# Patient Record
Sex: MALE | Race: WHITE | Employment: OTHER | URBAN - METROPOLITAN AREA
[De-identification: names, ages, dates, MRNs, and addresses within clinical notes are randomized per-mention and may not be internally consistent; named-entity substitution may affect disease eponyms.]

---

## 2020-01-01 ENCOUNTER — VIRTUAL VISIT (OUTPATIENT)
Dept: INTERNAL MEDICINE CLINIC | Age: 65
End: 2020-01-01
Payer: OTHER GOVERNMENT

## 2020-01-01 DIAGNOSIS — R05.8 COUGH DUE TO ACE INHIBITOR: Primary | ICD-10-CM

## 2020-01-01 DIAGNOSIS — T46.4X5A COUGH DUE TO ACE INHIBITOR: Primary | ICD-10-CM

## 2020-01-01 PROCEDURE — 99213 OFFICE O/P EST LOW 20 MIN: CPT | Performed by: INTERNAL MEDICINE

## 2020-01-01 RX ORDER — LOSARTAN POTASSIUM 50 MG/1
50 TABLET ORAL DAILY
Qty: 90 TAB | Refills: 3 | Status: SHIPPED | OUTPATIENT
Start: 2020-01-01 | End: 2021-01-01

## 2020-01-01 RX ORDER — INSULIN LISPRO 100 [IU]/ML
INJECTION, SOLUTION INTRAVENOUS; SUBCUTANEOUS
Qty: 60 ML | Refills: 3 | Status: SHIPPED | OUTPATIENT
Start: 2020-01-01

## 2020-08-09 RX ORDER — ATORVASTATIN CALCIUM 40 MG/1
TABLET, FILM COATED ORAL
Qty: 90 TAB | Refills: 3 | Status: SHIPPED | OUTPATIENT
Start: 2020-08-09

## 2020-11-20 PROBLEM — T46.4X5A COUGH DUE TO ACE INHIBITOR: Status: ACTIVE | Noted: 2020-01-01

## 2020-11-20 PROBLEM — R05.8 COUGH DUE TO ACE INHIBITOR: Status: ACTIVE | Noted: 2020-01-01

## 2020-11-20 NOTE — PROGRESS NOTES
Abhilash Major is a 63 y.o. male presenting for cough. Ranjit Hunter is seen today virtually using HIPAA compliant video conferencing technology. The patient has previously provided full consent to use this technology and understands the risks and benefits of proceeding. I am seeing the patient today from my office in [place] and the patient is in his home in [place]. No chief complaint on file. HPI Ranjit Hunter was down for routine 3-month follow-up of his hypertension and diabetes however he has a problem that he wanted to discuss. He has had a chronic cough producing some clear sputum for almost over a year he thinks. It pretty much constant he has not had any associated fever purulent sputum production pleuritic pain shortness of breath or hemoptysis. It does not seem to be exacerbated or initiated by anything that he can pinpoint. He has tried everything environmentally thinking it might be allergies. Frankly when I look over his list of medications I believe it probably is related to his ACE inhibitor. As far as his blood sugars go everything has looked good. He has had no hyper or hypoglycemic episodes. He continues on his previous medicines. No past medical history on file. Current Outpatient Medications on File Prior to Visit Medication Sig Dispense Refill  atorvastatin (LIPITOR) 40 mg tablet TAKE 1 TABLET DAILY 90 Tab 3 No current facility-administered medications on file prior to visit. ROS as noted in the history of present illness There were no vitals taken for this visit. Physical Exam because of the virtual nature of this exam no physical was able to be performed. Assessment & Plan as noted I believe that the chronic cough is related to his ACE inhibitor. I am going to switch him to an ARB because he is diabetic and probably needs the renal protective dosing.   I will recheck him in 2 to 3 months. We discussed at length ACE inhibitor related cough.

## 2020-12-28 PROBLEM — I10 ESSENTIAL HYPERTENSION: Status: ACTIVE | Noted: 2020-01-01

## 2020-12-28 PROBLEM — M19.90 OSTEOARTHRITIS: Status: ACTIVE | Noted: 2020-01-01

## 2020-12-28 PROBLEM — E11.9 DIABETES MELLITUS (HCC): Status: ACTIVE | Noted: 2020-01-01

## 2021-01-01 ENCOUNTER — TELEPHONE (OUTPATIENT)
Dept: INTERNAL MEDICINE CLINIC | Age: 66
End: 2021-01-01

## 2021-01-01 ENCOUNTER — HOSPITAL ENCOUNTER (EMERGENCY)
Age: 66
Discharge: HOME | End: 2021-05-19
Attending: EMERGENCY MEDICINE
Payer: OTHER GOVERNMENT

## 2021-01-01 ENCOUNTER — OFFICE VISIT (OUTPATIENT)
Dept: INTERNAL MEDICINE CLINIC | Age: 66
End: 2021-01-01
Payer: OTHER GOVERNMENT

## 2021-01-01 ENCOUNTER — OFFICE VISIT (OUTPATIENT)
Dept: FAMILY MEDICINE CLINIC | Age: 66
End: 2021-01-01
Payer: OTHER GOVERNMENT

## 2021-01-01 ENCOUNTER — APPOINTMENT (OUTPATIENT)
Dept: CT IMAGING | Age: 66
End: 2021-01-01
Attending: EMERGENCY MEDICINE
Payer: OTHER GOVERNMENT

## 2021-01-01 ENCOUNTER — HOSPITAL ENCOUNTER (OUTPATIENT)
Dept: GENERAL RADIOLOGY | Age: 66
Discharge: HOME | End: 2021-02-26
Attending: INTERNAL MEDICINE
Payer: OTHER GOVERNMENT

## 2021-01-01 ENCOUNTER — CLINICAL SUPPORT (OUTPATIENT)
Dept: INTERNAL MEDICINE CLINIC | Age: 66
End: 2021-01-01

## 2021-01-01 ENCOUNTER — HOSPITAL ENCOUNTER (OUTPATIENT)
Dept: ULTRASOUND IMAGING | Age: 66
Discharge: HOME | End: 2021-05-10
Attending: NURSE PRACTITIONER
Payer: OTHER GOVERNMENT

## 2021-01-01 VITALS
RESPIRATION RATE: 35 BRPM | DIASTOLIC BLOOD PRESSURE: 78 MMHG | BODY MASS INDEX: 42.86 KG/M2 | TEMPERATURE: 98.7 F | OXYGEN SATURATION: 95 % | WEIGHT: 315 LBS | SYSTOLIC BLOOD PRESSURE: 138 MMHG | HEART RATE: 135 BPM

## 2021-01-01 VITALS
HEART RATE: 91 BPM | SYSTOLIC BLOOD PRESSURE: 109 MMHG | TEMPERATURE: 98.3 F | OXYGEN SATURATION: 95 % | DIASTOLIC BLOOD PRESSURE: 65 MMHG | HEIGHT: 72 IN | BODY MASS INDEX: 38.06 KG/M2 | WEIGHT: 281 LBS

## 2021-01-01 VITALS
RESPIRATION RATE: 20 BRPM | BODY MASS INDEX: 37.32 KG/M2 | WEIGHT: 275.2 LBS | SYSTOLIC BLOOD PRESSURE: 120 MMHG | DIASTOLIC BLOOD PRESSURE: 68 MMHG | TEMPERATURE: 97.9 F

## 2021-01-01 VITALS
RESPIRATION RATE: 16 BRPM | HEIGHT: 72 IN | TEMPERATURE: 98.2 F | DIASTOLIC BLOOD PRESSURE: 63 MMHG | SYSTOLIC BLOOD PRESSURE: 151 MMHG | OXYGEN SATURATION: 99 % | BODY MASS INDEX: 36.57 KG/M2 | WEIGHT: 270 LBS | HEART RATE: 75 BPM

## 2021-01-01 VITALS
WEIGHT: 278.4 LBS | RESPIRATION RATE: 20 BRPM | SYSTOLIC BLOOD PRESSURE: 110 MMHG | BODY MASS INDEX: 37.71 KG/M2 | DIASTOLIC BLOOD PRESSURE: 70 MMHG | HEIGHT: 72 IN

## 2021-01-01 VITALS
BODY MASS INDEX: 38.82 KG/M2 | DIASTOLIC BLOOD PRESSURE: 88 MMHG | RESPIRATION RATE: 20 BRPM | TEMPERATURE: 98.7 F | WEIGHT: 286.6 LBS | HEIGHT: 72 IN | SYSTOLIC BLOOD PRESSURE: 150 MMHG

## 2021-01-01 DIAGNOSIS — C83.32 DIFFUSE LARGE B-CELL LYMPHOMA OF INTRATHORACIC LYMPH NODES (HCC): ICD-10-CM

## 2021-01-01 DIAGNOSIS — R22.1 NECK MASS: ICD-10-CM

## 2021-01-01 DIAGNOSIS — R05.9 COUGH: Primary | ICD-10-CM

## 2021-01-01 DIAGNOSIS — I10 ESSENTIAL HYPERTENSION: Primary | ICD-10-CM

## 2021-01-01 DIAGNOSIS — R59.0 CERVICAL LYMPHADENOPATHY: Primary | ICD-10-CM

## 2021-01-01 DIAGNOSIS — R22.1 MASS OF NECK: ICD-10-CM

## 2021-01-01 DIAGNOSIS — R05.9 COUGH: ICD-10-CM

## 2021-01-01 DIAGNOSIS — R22.1 MASS OF NECK: Primary | ICD-10-CM

## 2021-01-01 DIAGNOSIS — R01.1 MURMUR: ICD-10-CM

## 2021-01-01 DIAGNOSIS — R22.1 NECK MASS: Primary | ICD-10-CM

## 2021-01-01 LAB
ANION GAP SERPL CALC-SCNC: 7 MMOL/L
BASOPHILS # BLD: 0 K/UL (ref 0–0.1)
BASOPHILS NFR BLD: 1 % (ref 0–2)
BUN SERPL-MCNC: 32 MG/DL (ref 9–21)
BUN/CREAT SERPL: 25
CA-I BLD-MCNC: 10 MG/DL (ref 8.5–10.5)
CHLORIDE SERPL-SCNC: 107 MMOL/L (ref 94–111)
CO2 SERPL-SCNC: 24 MMOL/L (ref 21–33)
CREAT SERPL-MCNC: 1.3 MG/DL (ref 0.8–1.5)
EOSINOPHIL # BLD: 0.3 K/UL (ref 0–0.4)
EOSINOPHIL NFR BLD: 5 % (ref 0–5)
ERYTHROCYTE [DISTWIDTH] IN BLOOD BY AUTOMATED COUNT: 15.2 % (ref 11.6–14.5)
GLUCOSE SERPL-MCNC: 181 MG/DL (ref 70–110)
HCT VFR BLD AUTO: 37.2 % (ref 36–48)
HGB BLD-MCNC: 11.8 G/DL (ref 13–16)
IMM GRANULOCYTES # BLD AUTO: 0 K/UL
IMM GRANULOCYTES NFR BLD AUTO: 0 %
LYMPHOCYTES # BLD: 1.2 K/UL (ref 0.9–3.6)
LYMPHOCYTES NFR BLD: 21 % (ref 21–52)
MCH RBC QN AUTO: 29.4 PG (ref 24–34)
MCHC RBC AUTO-ENTMCNC: 31.7 G/DL (ref 31–37)
MCV RBC AUTO: 92.8 FL (ref 74–97)
MONOCYTES # BLD: 0.6 K/UL (ref 0.05–1.2)
MONOCYTES NFR BLD: 11 % (ref 3–10)
NEUTS SEG # BLD: 3.5 K/UL (ref 1.8–8)
NEUTS SEG NFR BLD: 62 % (ref 40–73)
PLATELET # BLD AUTO: 147 K/UL (ref 135–420)
PMV BLD AUTO: 11.2 FL (ref 9.2–11.8)
POTASSIUM SERPL-SCNC: 4.5 MMOL/L (ref 3.2–5.1)
RBC # BLD AUTO: 4.01 M/UL (ref 4.7–5.5)
SODIUM SERPL-SCNC: 138 MMOL/L (ref 135–145)
WBC # BLD AUTO: 5.6 K/UL (ref 4.6–13.2)

## 2021-01-01 PROCEDURE — 99283 EMERGENCY DEPT VISIT LOW MDM: CPT

## 2021-01-01 PROCEDURE — 99213 OFFICE O/P EST LOW 20 MIN: CPT | Performed by: NURSE PRACTITIONER

## 2021-01-01 PROCEDURE — 76536 US EXAM OF HEAD AND NECK: CPT

## 2021-01-01 PROCEDURE — 71046 X-RAY EXAM CHEST 2 VIEWS: CPT

## 2021-01-01 PROCEDURE — 99213 OFFICE O/P EST LOW 20 MIN: CPT | Performed by: INTERNAL MEDICINE

## 2021-01-01 PROCEDURE — 70491 CT SOFT TISSUE NECK W/DYE: CPT

## 2021-01-01 PROCEDURE — 85025 COMPLETE CBC W/AUTO DIFF WBC: CPT

## 2021-01-01 PROCEDURE — 74011000636 HC RX REV CODE- 636: Performed by: EMERGENCY MEDICINE

## 2021-01-01 PROCEDURE — 80048 BASIC METABOLIC PNL TOTAL CA: CPT

## 2021-01-01 PROCEDURE — 99214 OFFICE O/P EST MOD 30 MIN: CPT | Performed by: INTERNAL MEDICINE

## 2021-01-01 RX ORDER — INSULIN GLARGINE 100 [IU]/ML
INJECTION, SOLUTION SUBCUTANEOUS
Qty: 225 ML | Refills: 3 | Status: SHIPPED | OUTPATIENT
Start: 2021-01-01

## 2021-01-01 RX ORDER — GLIPIZIDE 10 MG/1
10 TABLET ORAL DAILY
COMMUNITY
End: 2021-01-01

## 2021-01-01 RX ORDER — BLOOD-GLUCOSE METER
KIT MISCELLANEOUS
Qty: 270 STRIP | Refills: 3 | Status: SHIPPED | OUTPATIENT
Start: 2021-01-01

## 2021-01-01 RX ORDER — PIOGLITAZONEHYDROCHLORIDE 15 MG/1
1 TABLET ORAL DAILY
COMMUNITY
Start: 2021-01-01 | End: 2021-01-01

## 2021-01-01 RX ORDER — MINERAL OIL
ENEMA (ML) RECTAL
Qty: 90 TAB | Refills: 3 | Status: SHIPPED | OUTPATIENT
Start: 2021-01-01

## 2021-01-01 RX ORDER — METFORMIN HYDROCHLORIDE 500 MG/1
1 TABLET ORAL 2 TIMES DAILY WITH MEALS
COMMUNITY
End: 2021-01-01

## 2021-01-01 RX ORDER — PIOGLITAZONEHYDROCHLORIDE 15 MG/1
TABLET ORAL
Qty: 90 TABLET | Refills: 3 | Status: SHIPPED | OUTPATIENT
Start: 2021-01-01

## 2021-01-01 RX ORDER — MINERAL OIL
1 ENEMA (ML) RECTAL DAILY
COMMUNITY
End: 2021-01-01

## 2021-01-01 RX ORDER — METFORMIN HYDROCHLORIDE 500 MG/1
TABLET ORAL
Qty: 360 TAB | Refills: 3 | Status: SHIPPED | OUTPATIENT
Start: 2021-01-01

## 2021-01-01 RX ORDER — LOSARTAN POTASSIUM 100 MG/1
100 TABLET ORAL DAILY
Qty: 90 TAB | Refills: 3 | Status: SHIPPED | OUTPATIENT
Start: 2021-01-01

## 2021-01-01 RX ORDER — GLIPIZIDE 10 MG/1
TABLET ORAL
Qty: 90 TAB | Refills: 3 | Status: SHIPPED | OUTPATIENT
Start: 2021-01-01

## 2021-01-01 RX ORDER — FLUTICASONE PROPIONATE 50 MCG
2 SPRAY, SUSPENSION (ML) NASAL DAILY
COMMUNITY

## 2021-01-01 RX ORDER — INSULIN GLARGINE 100 [IU]/ML
80 INJECTION, SOLUTION SUBCUTANEOUS DAILY
COMMUNITY
End: 2021-01-01

## 2021-01-01 RX ADMIN — IOPAMIDOL 100 ML: 755 INJECTION, SOLUTION INTRAVENOUS at 17:50

## 2021-02-23 PROBLEM — S19.9XXA NECK INJURY: Status: ACTIVE | Noted: 2017-01-30

## 2021-02-23 PROBLEM — T14.90XA TRAUMA: Status: ACTIVE | Noted: 2017-01-30

## 2021-02-23 PROBLEM — M43.10 RETROLISTHESIS: Status: ACTIVE | Noted: 2017-01-30

## 2021-02-25 NOTE — PROGRESS NOTES
Lex Kenny is a 63 y.o. male presenting for Beatties and hypertension. Chief Complaint Patient presents with  Cough HPI Roland Burkitt comes in today and he continues to have paroxysms of coughing. It seems that switching him to an ARB did not seem to do a whole lot of good. He blames it all on nasal drainage and allergies. He tells me he is retiring in a week and then plans to get in his jeep and drive across country to see and visit with his daughter in [place]. Hopefully he will be a little easier to manage once he gets off of the crazy work schedule that he presently keeps. He is at work in the mid afternoon and then does not get off and anywhere between 1 and 3:00 in the morning sometimes later. He has been checking his blood sugars on a regular basis and they have all been 140 or below which is pretty reasonable. I told him when he got back from [place] perhaps we could get a little more aggressive about his medical treatments although he does not sound like he is particularly interested in that. Past Medical History:  
Diagnosis Date  Diabetes mellitus ([facility] [address] 12/28/2020  Essential hypertension 12/28/2020  Osteoarthritis 12/28/2020 Current Outpatient Medications on File Prior to Visit Medication Sig Dispense Refill  glipiZIDE (GLUCOTROL) 10 mg tablet Take 10 mg by mouth daily.  fluticasone propionate (FLONASE) 50 mcg/actuation nasal spray 2 Sprays by Both Nostrils route daily.  metFORMIN (GLUCOPHAGE) 500 mg tablet Take 1 Tab by mouth two (2) times daily (with meals).  fexofenadine (ALLEGRA) 180 mg tablet Take 1 Tab by mouth daily.  insulin glargine (LANTUS,BASAGLAR) 100 unit/mL (3 mL) inpn 80 Units by SubCUTAneous route daily.  insulin lispro (HUMALOG) 100 unit/mL injection INJECT 10 TO 20 UNITS UNDER THE SKIN WITH MEALS USING A SLIDING SCALE 60 mL 3  
 losartan (COZAAR) 50 mg tablet Take 1 Tab by mouth daily.  90 Tab 3  
  atorvastatin (LIPITOR) 40 mg tablet TAKE 1 TABLET DAILY 90 Tab 3  pioglitazone (ACTOS) 15 mg tablet Take 1 Tab by mouth daily. No current facility-administered medications on file prior to visit. ROS was reviewed and negative except as noted above Visit Vitals Temp 98.7 °F (37.1 °C) Resp 20 Ht 6' (1.829 m) Wt 286 lb 9.6 oz (130 kg) BMI 38.87 kg/m² Physical Exam overweight  man alert and oriented no acute distress normocephalic conjunctiva pink sclera anicteric neck supple no lymphadenopathy lungs today sound clear to auscultation heart rhythm regular extremities without edema Assessment & Plan him increasing his losartan to 100 mg a day and will just take 2 of the 50s until he gets his 100 mg prescription from [facility]. I will have him come by in about 10 days just for a nurse check to check his blood pressure. I am also going to order a chest x-ray because of his cough.

## 2021-02-25 NOTE — PROGRESS NOTES
Alma Aiken presents today for Chief Complaint Patient presents with  Cough Is someone accompanying this pt? no 
 
Is the patient using any DME equipment during [address]? no 
 
Depression Screening: 
3 most recent PHQ Screens 2/25/2021 Little interest or pleasure in doing things Not at all Feeling down, depressed, irritable, or hopeless Not at all Total Score PHQ 2 0 Learning Assessment: 
Learning Assessment 2/25/2021 PRIMARY LEARNER Patient HIGHEST LEVEL OF EDUCATION - PRIMARY LEARNER  GRADUATED HIGH SCHOOL OR GED  
BARRIERS PRIMARY LEARNER NONE  
CO-LEARNER CAREGIVER No  
PRIMARY LANGUAGE ENGLISH  
LEARNER PREFERENCE PRIMARY OTHER (COMMENT) ANSWERED BY patient RELATIONSHIP SELF Abuse Screening: 
Abuse Screening Questionnaire 2/25/2021 Do you ever feel afraid of your partner? Victor M Horde Are you in a relationship with someone who physically or mentally threatens you? Victor M Horde Is it safe for you to go home? Uli Whitaker Fall Risk Fall Risk Assessment, last 12 mths 2/25/2021 Able to walk? Yes Fall in past 12 months? 0 Do you feel unsteady? 0 Are you worried about falling 0 ADL ADL Assessment 2/25/2021 Feeding yourself No Help Needed Getting from bed to chair No Help Needed Getting dressed No Help Needed Bathing or showering No Help Needed Walk across the room (includes cane/walker) No Help Needed Using the telphone No Help Needed Taking your medications No Help Needed Preparing meals No Help Needed Managing money (expenses/bills) No Help Needed Moderately strenuous housework (laundry) No Help Needed Shopping for personal items (toiletries/medicines) No Help Needed Shopping for groceries No Help Needed Driving No Help Needed Climbing a flight of stairs No Help Needed Getting to places beyond walking distances No Help Needed Health Maintenance reviewed and discussed and ordered per Provider. Health Maintenance Due Topic Date Due  
  Hepatitis C Screening  1955  Pneumococcal 0-64 years (1 of 1 - PPSV23) 03/16/1963  Foot Exam Q1  03/16/1967  MICROALBUMIN Q1  03/16/1967  Eye Exam Retinal or Dilated  03/16/1967  Lipid Screen  03/16/1967  COVID-19 Vaccine (1 of 2) 03/16/1973  
 DTaP/Tdap/Td series (1 - Tdap) 03/16/1978  Shingrix Vaccine Age 50> (1 of 2) 03/16/2007  Colorectal Cancer Screening Combo  03/16/2007  Flu Vaccine (1) 09/01/2020 Manas Lauren Coordination of Care: 1. Have you been to the ER, urgent care clinic since your last visit? Hospitalized since your last visit? Yes, [facility] 2. Have you seen or consulted any other health care providers outside of the [address] [place] [address] since your last visit? Include any pap smears or colon screening.

## 2021-03-04 NOTE — PROGRESS NOTES
Vianney Machuca (: 1955) is a 63 y.o. male, established patient, here for evaluation of the following chief complaint(s): 
No chief complaint on file. Patient came by to have BP retaken after getting an elevated reading on his visit earlier this week. BP today is 120/68. Dr Barraza notified and pleased with result. Patient reassured. An electronic signature was used to authenticate this note.  
-- Vashti Moreau LPN

## 2021-05-05 PROBLEM — R22.1 MASS OF NECK: Status: ACTIVE | Noted: 2021-01-01

## 2021-05-05 PROBLEM — R01.1 MURMUR: Status: ACTIVE | Noted: 2021-01-01

## 2021-05-05 NOTE — PROGRESS NOTES
HPI 
Ashanti Lopez is a 61 y.o. male and presents today for Cyst (1 month, on neck) PCP[facility] Patient has no URI symptoms today. He reports that he tried to contact his PCPs office due to a mass in the right region of his neck. He was transferred to our office for sick visit. I am not sure how this happened. He states he has been out of town for the past few months. States that about 2 months ago he noticed a mass in his right neck. He says that the area is a little tender. He does not have a sore throat. He denies fever, cough, chills, body aches, N/D/V, or any other URI symptoms. He has no concern for Covid. Not seen anyone for the mass in his neck. He denies any family or personal history of thyroid disorders. Recent Travel Screening and Travel History documentation Travel Screening Question   Response In the last month, have you been in contact with someone who was confirmed or suspected to have Coronavirus / COVID-19? No / Tomás Moreno Have you had a COVID-19 viral test in the last 14 days? No  
 Do you have any of the following new or worsening symptoms? Have you traveled internationally or domestically in the last month? No  
  
Travel History   Travel since 04/05/21 No documented travel since 04/05/21 Screening On the basis of positive PHQ-9 screening ( ), C-SSRS completed. Patient will follow-up as needed/as directed with PCP. Allergies Allergies Allergen Reactions  Pollen Extracts Shortness of Breath Pt report having Hay Fever Medications Current Outpatient Medications Medication Sig Dispense  glipiZIDE (GLUCOTROL) 10 mg tablet TAKE 1 TABLET DAILY 90 Tab  metFORMIN (GLUCOPHAGE) 500 mg tablet TAKE 2 TABLETS TWICE A  Tab  fexofenadine (ALLEGRA) 180 mg tablet TAKE 1 TABLET DAILY ( FEXOFENADINE IS GENERIC FOR ALLEGRA ) 90 Tab  pioglitazone (ACTOS) 15 mg tablet Take 1 Tab by mouth daily.    
 fluticasone propionate (FLONASE) 50 mcg/actuation nasal spray 2 Sprays by Both Nostrils route daily.  insulin glargine (LANTUS,BASAGLAR) 100 unit/mL (3 mL) inpn 80 Units by SubCUTAneous route daily.  losartan (COZAAR) 100 mg tablet Take 1 Tab by mouth daily. 90 Tab  insulin lispro (HUMALOG) 100 unit/mL injection INJECT 10 TO 20 UNITS UNDER THE SKIN WITH MEALS USING A SLIDING SCALE 60 mL  atorvastatin (LIPITOR) 40 mg tablet TAKE 1 TABLET DAILY 90 Tab  losartan (COZAAR) 50 mg tablet Take 1 Tab by mouth daily. 90 Tab No current facility-administered medications for this visit. Problem List 
Patient Active Problem List  
 Diagnosis Date Noted  Murmur 05/05/2021  Mass of neck 05/05/2021  Diabetes mellitus ([facility] [address] 12/28/2020  Essential hypertension 12/28/2020  Osteoarthritis 12/28/2020  Cough due to ACE inhibitor 11/20/2020  Neck injury 01/30/2017  Retrolisthesis 01/30/2017  Trauma 01/30/2017 Vitals Visit Vitals /65 Pulse 91 Temp 98.3 °F (36.8 °C) Ht 6' (1.829 m) Wt 281 lb (127.5 kg) SpO2 95% BMI 38.11 kg/m² ROS Review of Systems Constitutional: Negative for chills, fever and malaise/fatigue. HENT: Negative for congestion, ear pain, sinus pain and sore throat. Right neck mass Eyes: Negative for blurred vision and redness. Respiratory: Negative for cough, sputum production, shortness of breath and wheezing. Cardiovascular: Negative for chest pain, palpitations and leg swelling. Gastrointestinal: Negative for abdominal pain, constipation, diarrhea, heartburn, nausea and vomiting. Genitourinary: Negative for dysuria and hematuria. Musculoskeletal: Negative for falls, joint pain and myalgias. Skin: Negative for rash. Neurological: Negative for dizziness, seizures, weakness and headaches. Endo/Heme/Allergies: Does not bruise/bleed easily. Psychiatric/Behavioral: Negative for depression and suicidal ideas.  The patient does not have insomnia. Physical Exam 
Physical Exam 
Vitals signs and nursing note reviewed. Constitutional:   
   General: He is awake. He is not in acute distress. Appearance: Normal appearance. He is obese. He is not ill-appearing or toxic-appearing. HENT:  
   Head: Normocephalic and atraumatic. Right Ear: Tympanic membrane, ear canal and external ear normal. There is no impacted cerumen. Left Ear: Tympanic membrane, ear canal and external ear normal. There is no impacted cerumen. Nose: Nose normal. No congestion or rhinorrhea. Right Sinus: No maxillary sinus tenderness or frontal sinus tenderness. Left Sinus: No maxillary sinus tenderness or frontal sinus tenderness. Mouth/Throat:  
   Mouth: Mucous membranes are moist.  
   Pharynx: Oropharynx is clear. No oropharyngeal exudate or posterior oropharyngeal erythema. Eyes:  
   General:     
   Right eye: No discharge. Left eye: No discharge. Extraocular Movements: Extraocular movements intact. Conjunctiva/sclera: Conjunctivae normal.  
   Pupils: Pupils are equal, round, and reactive to light. Neck: Musculoskeletal: Normal range of motion. Normal range of motion. No edema, erythema, neck rigidity or pain with movement. Thyroid: No thyroid mass, thyromegaly or thyroid tenderness. Comments: Hardened mass, in the right supraclavicular sis region Cardiovascular:  
   Rate and Rhythm: Normal rate and regular rhythm. Pulses: Normal pulses. Heart sounds: Murmur present. Pulmonary:  
   Effort: Pulmonary effort is normal. No respiratory distress. Breath sounds: Normal breath sounds. No stridor. No wheezing, rhonchi or rales. Chest:  
   Chest wall: No tenderness. Abdominal:  
   General: Abdomen is flat. Bowel sounds are normal. There is no distension. Palpations: Abdomen is soft. There is no mass. Tenderness: There is no abdominal tenderness.  There is no right CVA tenderness, left CVA tenderness, guarding or rebound. Hernia: No hernia is present. Musculoskeletal: Normal range of motion. General: No swelling. Right lower leg: No edema. Left lower leg: No edema. Skin: 
   General: Skin is warm and dry. Capillary Refill: Capillary refill takes less than 2 seconds. Coloration: Skin is not jaundiced or pale. Findings: No bruising, erythema, lesion or rash. Neurological:  
   General: No focal deficit present. Mental Status: He is alert and oriented to person, place, and time. Cranial Nerves: No cranial nerve deficit. Motor: No weakness. Gait: Gait normal.  
   Deep Tendon Reflexes: Reflexes normal.  
Psychiatric:     
   Mood and Affect: Mood normal.     
   Behavior: Behavior normal.     
   Thought Content: Thought content normal.     
   Judgment: Judgment normal.  
 
  
 
Assessment/Plan 1. Mass of neck Located in the right supraclavicular sis region; it is a hardened mass; it is not TTP; rough estimated size 3.5-4cm. Patient denies any trauma. Patient denies any personal or family history of thyroid disorders. We will get an ultrasound of his neck with attention to thyroid. We will call him with results and likely refer him back to his PCP for further eval. 
- US HEAD NECK SOFT TISSUE; Future 2. Murmur Noted on exam; patient unaware that he has a murmur; I recommended EKG/echo and patient declined at this time. Denies any history of chest pain or palpitations. Reviewed with him/her that COVID-19 pandemic is an evolving situation with rapidly changing recommendations & guidelines. Medical decisions are made based on the the best information available at the time. Recommended he/she stay tuned for updates published by trusted sources and to advise your PCP of any unexpected changes in clinical condition Disclaimer: 
I have discussed the diagnosis with the patient and the intended plan as seen above. The patient understands our medical plan. The risks, benefits and significant side effects of all medications have been reviewed. Anticipated time course and progression of condition reviewed. All questions have been addressed. He/She received an after visit summary, with information reviewed, and questions answered. Where appropriate, he/she is instructed to call the clinic if he/she has not been notified either by phone or through [address] with test results. The patient  is to call if his condition worsens or fails to improve or if significant side effects are experienced.   
 
 
Yojana Silverman, SONIA-BC

## 2021-05-05 NOTE — PROGRESS NOTES
Marylu Hazel presents today for Chief Complaint Patient presents with  Cyst  
  1 month, on neck Is someone accompanying this pt? No       
Is the patient using any DME equipment during OV? no 
 
Depression Screening: 
3 most recent PHQ Screens 5/5/2021 Little interest or pleasure in doing things Not at all Feeling down, depressed, irritable, or hopeless Not at all Total Score PHQ 2 0 Learning Assessment: 
Learning Assessment 2/25/2021 PRIMARY LEARNER Patient HIGHEST LEVEL OF EDUCATION - PRIMARY LEARNER  GRADUATED HIGH SCHOOL OR GED  
BARRIERS PRIMARY LEARNER NONE  
CO-LEARNER CAREGIVER No  
PRIMARY LANGUAGE ENGLISH  
LEARNER PREFERENCE PRIMARY OTHER (COMMENT) ANSWERED BY patient RELATIONSHIP SELF Fall Risk Fall Risk Assessment, last 12 mths 2/25/2021 Able to walk? Yes Fall in past 12 months? 0 Do you feel unsteady? 0 Are you worried about falling 0 ADL ADL Assessment 2/25/2021 Feeding yourself No Help Needed Getting from bed to chair No Help Needed Getting dressed No Help Needed Bathing or showering No Help Needed Walk across the room (includes cane/walker) No Help Needed Using the telphone No Help Needed Taking your medications No Help Needed Preparing meals No Help Needed Managing money (expenses/bills) No Help Needed Moderately strenuous housework (laundry) No Help Needed Shopping for personal items (toiletries/medicines) No Help Needed Shopping for groceries No Help Needed Driving No Help Needed Climbing a flight of stairs No Help Needed Getting to places beyond walking distances No Help Needed Health Maintenance reviewed and discussed and ordered per Provider. Health Maintenance Due Topic Date Due  
 Hepatitis C Screening  Never done  Pneumococcal 0-64 years (1 of 1 - PPSV23) Never done  Foot Exam Q1  Never done  MICROALBUMIN Q1  Never done  Eye Exam Retinal or Dilated  Never done  Lipid Screen Never done  COVID-19 Vaccine (1) Never done  DTaP/Tdap/Td series (1 - Tdap) Never done  Shingrix Vaccine Age 50> (1 of 2) Never done  Colorectal Cancer Screening Combo  Never done Lesjosh Raymundo Coordination of Care: 1. Have you been to the ER, urgent care clinic since your last visit? Hospitalized since your last visit? no 
 
2. Have you seen or consulted any other health care providers outside of the [address] [place] [address] since your last visit? Include any pap smears or colon screening.  No

## 2021-05-19 NOTE — ED PROVIDER NOTES
EMERGENCY DEPARTMENT HISTORY AND PHYSICAL EXAM 
 
 
Date: 5/19/2021 Patient Name: Leonard Fitzgerald History of Presenting Illness Chief Complaint Patient presents with  Abnormal Ultrasound  
   blood clot in neck History Provided By: Patient HPI: Leonard Fitzgerald, 61 y.o. male with a past medical history significant diabetes, hypertension and Osteoarthritis presents to the ED with cc of lump on the right side neck with blood clot. Patient has had an enlarging lump right side of his neck since March. He had ultrasound done by PCP May 10 and was called to come to ED for further evaluation. Patient has no pain. He denies any fever or chills. Denies any injury. Denies any toothache or headache. There are no other complaints, changes, or physical findings at this time. PCP: Gianna Barraza., MD 
 
Current Outpatient Medications Medication Sig Dispense Refill  glipiZIDE (GLUCOTROL) 10 mg tablet TAKE 1 TABLET DAILY 90 Tab 3  
 metFORMIN (GLUCOPHAGE) 500 mg tablet TAKE 2 TABLETS TWICE A  Tab 3  
 fexofenadine (ALLEGRA) 180 mg tablet TAKE 1 TABLET DAILY ( FEXOFENADINE IS GENERIC FOR ALLEGRA ) 90 Tab 3  pioglitazone (ACTOS) 15 mg tablet Take 1 Tab by mouth daily.  fluticasone propionate (FLONASE) 50 mcg/actuation nasal spray 2 Sprays by Both Nostrils route daily.  insulin glargine (LANTUS,BASAGLAR) 100 unit/mL (3 mL) inpn 80 Units by SubCUTAneous route daily.  losartan (COZAAR) 100 mg tablet Take 1 Tab by mouth daily. 90 Tab 3  
 insulin lispro (HUMALOG) 100 unit/mL injection INJECT 10 TO 20 UNITS UNDER THE SKIN WITH MEALS USING A SLIDING SCALE 60 mL 3  
 losartan (COZAAR) 50 mg tablet Take 1 Tab by mouth daily. 90 Tab 3  
 atorvastatin (LIPITOR) 40 mg tablet TAKE 1 TABLET DAILY 90 Tab 3 Past History Past Medical History: 
Past Medical History:  
Diagnosis Date  Diabetes mellitus ([address] 12/28/2020  Essential hypertension 12/28/2020  Osteoarthritis 12/28/2020 Past Surgical History: 
Past Surgical History:  
Procedure Laterality Date  HX KNEE REPLACEMENT Bilateral   
 VASCULAR SURGERY PROCEDURE UNLIST    
 lymph node removal right elbow Family History: 
History reviewed. No pertinent family history. Social History: 
Social History Tobacco Use  Smoking status: Never Smoker  Smokeless tobacco: Never Used Vaping Use  Vaping Use: Never used Substance Use Topics  Alcohol use: Yes Comment: occasionally  Drug use: Never Allergies: Allergies Allergen Reactions  Pollen Extracts Shortness of Breath Pt report having Hay Fever Review of Systems Review of Systems Constitutional: Negative for chills and fever. HENT: Negative for congestion and sore throat. Lump right side neck. Respiratory: Negative for cough and shortness of breath. Cardiovascular: Negative for chest pain and palpitations. Gastrointestinal: Negative for abdominal pain, nausea and vomiting. Genitourinary: Negative for dysuria, flank pain and frequency. Musculoskeletal: Negative for arthralgias, joint swelling and myalgias. Skin: Negative for color change and wound. Neurological: Negative for dizziness, weakness, light-headedness and headaches. Hematological: Negative for adenopathy. Physical Exam  
 
Physical Exam 
Vitals and nursing note reviewed. Constitutional:   
   General: He is not in acute distress. Appearance: He is well-developed. He is not diaphoretic. Comments: Obese male in no distress. HENT:  
   Head: Normocephalic and atraumatic. No right periorbital erythema or left periorbital erythema. Jaw: No trismus. Right Ear: External ear normal. No drainage or swelling. Tympanic membrane is not perforated, erythematous or bulging. Left Ear: External ear normal. No drainage or swelling. Tympanic membrane is not perforated, erythematous or bulging. Nose: Nose normal. No mucosal edema or rhinorrhea. Right Sinus: No maxillary sinus tenderness or frontal sinus tenderness. Left Sinus: No maxillary sinus tenderness or frontal sinus tenderness. Mouth/Throat:  
   Mouth: No oral lesions. Dentition: No dental abscesses. Pharynx: Uvula midline. No oropharyngeal exudate, posterior oropharyngeal erythema or uvula swelling. Tonsils: No tonsillar abscesses. Eyes:  
   General: No scleral icterus. Right eye: No discharge. Left eye: No discharge. Conjunctiva/sclera: Conjunctivae normal.  
Neck:  
   Comments: There is a palpable soft nontender mass lateral right submandibular angle. There is no induration. Cardiovascular:  
   Rate and Rhythm: Normal rate and regular rhythm. Heart sounds: Normal heart sounds. No murmur heard. No friction rub. No gallop. Pulmonary:  
   Effort: Pulmonary effort is normal. No tachypnea, accessory muscle usage or respiratory distress. Breath sounds: Normal breath sounds. No decreased breath sounds, wheezing, rhonchi or rales. Abdominal:  
   General: Bowel sounds are normal. There is no distension. Palpations: Abdomen is soft. Tenderness: There is no abdominal tenderness. Comments: Obese soft and nontender. Musculoskeletal:     
   General: No tenderness. Normal range of motion. Cervical back: Normal range of motion and neck supple. Lymphadenopathy:  
   Cervical: No cervical adenopathy. Skin: 
   General: Skin is warm and dry. Neurological:  
   Mental Status: He is alert and oriented to person, place, and time. Psychiatric:     
   Judgment: Judgment normal.  
 
 
 
Lab and Diagnostic Study Results Labs - Recent Results (from the past 12 hour(s)) CBC WITH AUTOMATED DIFF Collection Time: 05/19/21  4:47 PM  
Result Value Ref Range WBC 5.6 4.6 - 13.2 K/uL  
 RBC 4.01 (L) 4.70 - 5.50 M/uL  
 HGB 11.8 (L) 13.0 - 16.0 g/dL  HCT 37.2 36.0 - 48.0 % MCV 92.8 74.0 - 97.0 FL  
 MCH 29.4 24.0 - 34.0 PG  
 MCHC 31.7 31.0 - 37.0 g/dL  
 RDW 15.2 (H) 11.6 - 14.5 % PLATELET 494 599 - 607 K/uL MPV 11.2 9.2 - 11.8 FL  
 NEUTROPHILS 62 40 - 73 % LYMPHOCYTES 21 21 - 52 % MONOCYTES 11 (H) 3 - 10 % EOSINOPHILS 5 0 - 5 % BASOPHILS 1 0 - 2 % IMMATURE GRANULOCYTES 0 %  
 ABS. NEUTROPHILS 3.5 1.8 - 8.0 K/UL  
 ABS. LYMPHOCYTES 1.2 0.9 - 3.6 K/UL  
 ABS. MONOCYTES 0.6 0.05 - 1.2 K/UL  
 ABS. EOSINOPHILS 0.3 0.0 - 0.4 K/UL  
 ABS. BASOPHILS 0.0 0.0 - 0.1 K/UL  
 ABS. IMM. GRANS. 0.0 K/UL METABOLIC PANEL, BASIC Collection Time: 05/19/21  4:47 PM  
Result Value Ref Range Sodium 138 135 - 145 mmol/L Potassium 4.5 3.2 - 5.1 mmol/L Chloride 107 94 - 111 mmol/L  
 CO2 24 21 - 33 mmol/L Anion gap 7 mmol/L Glucose 181 (H) 70 - 110 mg/dL BUN 32 (H) 9 - 21 mg/dL Creatinine 1.30 0.8 - 1.50 mg/dL BUN/Creatinine ratio 25 GFR est AA >60 ml/min/1.73m2 GFR est non-AA 56 ml/min/1.73m2 Calcium 10.0 8.5 - 10.5 mg/dL Radiologic Studies -  
[unfilled] CT Results  (Last 48 hours) 05/19/21 1802  CT NECK SOFT TISSUE W CONT Final result Impression: 1. Multiple enlarged bilateral lymph nodes as detailed above with the greatest  
predominance on the right. Additionally, there are multiple enlarged superior  
mediastinal lymph nodes. The overall constellation of findings is concerning for  
possible lymphoma for which further clinical correlation is recommended. This report has been generated using voice recognition software. Narrative:  MEDICAL RECORDS NUMBER: [id number] EXAM: CT NECK WITH CONTRAST  
   
DATE: 5/19/2021 6:02 PM  
   
HISTORY: 61years old Male.  right neck mass RADIATION DOSE INFORMATION: Automated exposure control dose reduction techniques  
were used.   
   
TECHNIQUE: Multiple contiguous axial sections of neck were obtained after administration of contrast.  Sagittal and coronal reconstructions were  
performed. 100 cc of IV contrast was administered. COMPARISONS: None FINDINGS:  
   
The aerodigestive tract is patent without any exophytic mucosal mass. Vocal cords are symmetric. The epiglottis, aryepiglottic fold and piriform  
sinuses are within normal limits. The bilateral parotid glands and submandibular glands are within normal limits. The thyroid gland is enhancing homogeneously without a focal mass. There is abnormally enlarged right-sided lymph nodes anterior to the carotid  
artery measuring upwards of 2 cm. There are multiple other enlarged right-sided  
lymph nodes extending down into the supraclavicular space. There is enlarged  
contralateral lymph nodes in the left suprahilar clavicular space measuring  
upwards of 11 mm as seen on image 88. The thyroid cartilage, cricoid cartilage and arytenoid cartilage are within  
normal limits. There are significantly enlarged masses throughout the upper mediastinum. CXR Results  (Last 48 hours) None Medical Decision Making and ED Course - I am the first and primary provider for this patient AND AM THE PRIMARY PROVIDER OF RECORD. - I reviewed the vital signs, available nursing notes, past medical history, past surgical history, family history and social history. - Initial assessment performed. The patients presenting problems have been discussed, and the staff are in agreement with the care plan formulated and outlined with them. I have encouraged them to ask questions as they arise throughout their visit. Vital Signs-Reviewed the patient's vital signs. Patient Vitals for the past 12 hrs: 
 Temp Pulse Resp BP SpO2  
05/19/21 1740  73  (!) 115/55 98 % 05/19/21 1645  83  121/61   
05/19/21 1514 98.2 °F (36.8 °C) 95 16 (!) 136/55 95 % Records Reviewed: Nursing Notes, Old Medical Records and Previous Radiology Studies The patient presents with nodes with a differential diagnosis of cyst, lymphoma, malignancy, abscess ED Course:  
 
 
  
 
 
Provider Notes (Medical Decision Making): Consultations:  
 
 
Consultations:  
 
 
 
Procedures and Critical Care Disposition Disposition: Condition stable Diagnosis: 1. Cervical lymphadenopathy Disposition:  
 
Follow-up Information Follow up With Specialties Details Why Contact Info Colonel Tigist Barraza MD Internal Medicine, Internal Medicine In 1 day  [address] [address] [phone] Rose Mary Vieyra DO Otolaryngology   [address] Tony G [address] [address] [phone] [facility] EMERGENCY DEPT Emergency Medicine   [address] Patient's Medications Start Taking No medications on file Continue Taking ATORVASTATIN (LIPITOR) 40 MG TABLET    TAKE 1 TABLET DAILY FEXOFENADINE (ALLEGRA) 180 MG TABLET    TAKE 1 TABLET DAILY ( FEXOFENADINE IS GENERIC FOR ALLEGRA ) FLUTICASONE PROPIONATE (FLONASE) 50 MCG/ACTUATION NASAL SPRAY    2 Sprays by Both Nostrils route daily. GLIPIZIDE (GLUCOTROL) 10 MG TABLET    TAKE 1 TABLET DAILY INSULIN GLARGINE (LANTUS,BASAGLAR) 100 UNIT/ML (3 ML) INPN    80 Units by SubCUTAneous route daily. INSULIN LISPRO (HUMALOG) 100 UNIT/ML INJECTION    INJECT 10 TO 20 UNITS UNDER THE SKIN WITH MEALS USING A SLIDING SCALE  
 LOSARTAN (COZAAR) 100 MG TABLET    Take 1 Tab by mouth daily. LOSARTAN (COZAAR) 50 MG TABLET    Take 1 Tab by mouth daily. METFORMIN (GLUCOPHAGE) 500 MG TABLET    TAKE 2 TABLETS TWICE A DAY PIOGLITAZONE (ACTOS) 15 MG TABLET    Take 1 Tab by mouth daily. These Medications have changed No medications on file Stop Taking No medications on file Remove if not discharged DISCHARGE PLAN: 
1.   
Current Discharge Medication List  
  
CONTINUE these medications which have NOT CHANGED Details  
glipiZIDE (GLUCOTROL) 10 mg tablet TAKE 1 TABLET DAILY Qty: 90 Tab, Refills: 3  
  
metFORMIN (GLUCOPHAGE) 500 mg tablet TAKE 2 TABLETS TWICE A DAY Qty: 360 Tab, Refills: 3  
  
fexofenadine (ALLEGRA) 180 mg tablet TAKE 1 TABLET DAILY ( FEXOFENADINE IS GENERIC FOR ALLEGRA ) Qty: 90 Tab, Refills: 3  
  
pioglitazone (ACTOS) 15 mg tablet Take 1 Tab by mouth daily. fluticasone propionate (FLONASE) 50 mcg/actuation nasal spray 2 Sprays by Both Nostrils route daily. insulin glargine (LANTUS,BASAGLAR) 100 unit/mL (3 mL) inpn 80 Units by SubCUTAneous route daily. !! losartan (COZAAR) 100 mg tablet Take 1 Tab by mouth daily. Qty: 90 Tab, Refills: 3  
  
insulin lispro (HUMALOG) 100 unit/mL injection INJECT 10 TO 20 UNITS UNDER THE SKIN WITH MEALS USING A SLIDING SCALE Qty: 60 mL, Refills: 3  
  
!! losartan (COZAAR) 50 mg tablet Take 1 Tab by mouth daily. Qty: 90 Tab, Refills: 3  
  
atorvastatin (LIPITOR) 40 mg tablet TAKE 1 TABLET DAILY Qty: 90 Tab, Refills: 3  
  
 !! - Potential duplicate medications found. Please discuss with provider. 2.  
Follow-up Information Follow up With Specialties Details Why Contact Info Kiley Barraza MD Internal Medicine, Internal Medicine In 1 day  [address] [address] [phone] Joaquín Vallejo DO Otolaryngology   [address] [address] [phone] [facility] EMERGENCY DEPT Emergency Medicine   [address] 3. Return to ED if worse 4. Current Discharge Medication List  
  
 
 
Diagnosis Clinical Impression: 1. Cervical lymphadenopathy Attestations: Saul Almazan MD 
 
Please note that this dictation was completed with [employer], the [employer] voice recognition software.   Quite often unanticipated grammatical, syntax, homophones, and other interpretive errors are inadvertently transcribed by the computer software. Please disregard these errors. Please excuse any errors that have escaped final proofreading. Thank you.

## 2021-05-19 NOTE — ED TRIAGE NOTES
Patient noticed a lump in neck on right side in March. He went to PCP office ultrasound was ordered for May 10 th. He received a phone call today stating they saw a blood clot on that ultrasound and advised him to come to ER. Pt denies any symptoms.

## 2021-05-19 NOTE — PROGRESS NOTES
His ultrasound shows enlarged lymph nodes in his neck but apparently he has a blood clot in his neck/ He needs to go to the ER asap for futher eval/tx of this! ! And cont to see pcp

## 2021-05-24 NOTE — PROGRESS NOTES
Carolina Portilol presents today for Chief Complaint Patient presents with  Diabetes Is someone accompanying this pt? no 
 
Is the patient using any DME equipment during [address]? no 
 
Depression Screening: 
3 most recent PHQ Screens 5/24/2021 Little interest or pleasure in doing things Not at all Feeling down, depressed, irritable, or hopeless Not at all Total Score PHQ 2 0 Learning Assessment: 
Learning Assessment 2/25/2021 PRIMARY LEARNER Patient HIGHEST LEVEL OF EDUCATION - PRIMARY LEARNER  GRADUATED HIGH SCHOOL OR GED  
BARRIERS PRIMARY LEARNER NONE  
CO-LEARNER CAREGIVER No  
PRIMARY LANGUAGE ENGLISH  
LEARNER PREFERENCE PRIMARY OTHER (COMMENT) ANSWERED BY patient RELATIONSHIP SELF Abuse Screening: 
Abuse Screening Questionnaire 5/24/2021 Do you ever feel afraid of your partner? Alexander Lowing Are you in a relationship with someone who physically or mentally threatens you? Alexander Valdivia Is it safe for you to go home? Kasey Lunch Fall Risk Fall Risk Assessment, last 12 mths 2/25/2021 Able to walk? Yes Fall in past 12 months? 0 Do you feel unsteady? 0 Are you worried about falling 0 ADL ADL Assessment 5/24/2021 Feeding yourself No Help Needed Getting from bed to chair No Help Needed Getting dressed No Help Needed Bathing or showering No Help Needed Walk across the room (includes cane/walker) No Help Needed Using the telphone No Help Needed Taking your medications No Help Needed Preparing meals No Help Needed Managing money (expenses/bills) No Help Needed Moderately strenuous housework (laundry) No Help Needed Shopping for personal items (toiletries/medicines) No Help Needed Shopping for groceries No Help Needed Driving No Help Needed Climbing a flight of stairs No Help Needed Getting to places beyond walking distances No Help Needed Health Maintenance reviewed and discussed and ordered per Provider. Health Maintenance Due Topic Date Due  
 Hepatitis C Screening  Never done  Pneumococcal 0-64 years (1 of 2 - PPSV23) Never done  Foot Exam Q1  Never done  MICROALBUMIN Q1  Never done  Eye Exam Retinal or Dilated  Never done  Lipid Screen  Never done  COVID-19 Vaccine (1) Never done  DTaP/Tdap/Td series (1 - Tdap) Never done  Shingrix Vaccine Age 50> (1 of 2) Never done  Colorectal Cancer Screening Combo  Never done Vira Libman Coordination of Care: 1. Have you been to the ER, urgent care clinic since your last visit? Hospitalized since your last visit? no 
 
2. Have you seen or consulted any other health care providers outside of the [address] [place] [address] since your last visit? Include any pap smears or colon screening.  no

## 2021-05-24 NOTE — PROGRESS NOTES
Elvia Fields is a 61 y.o. male presenting for right neck mass Chief Complaint Patient presents with  Referral Request  
  
 
HPI Harriet Bone is in today requesting an ENT referral.  While he was on his trip across country he noticed some swelling in his right neck. No pain no tenderness no dysphagia or odynophagia no weight loss no fevers no chills no nausea no vomiting. When he got home he called and tried to be seen and my office that day was full. Apparently he went to see Umu Taylor who ordered an ultrasound and her office called him subsequently to tell him that his ultrasound showed a blood clot in his neck and he should go to the emergency room. In the emergency room a CT scan was ordered which showed multiple cervical and supraclavicular lymph nodes. He is referred back to us for an ENT referral.  As far as he is concerned his symptoms are the same as noted above and are absent. As an aside his blood pressure sugar is not well controlled and I was hoping once he got on a more regular schedule we could work on that. Right now the more pressing problem list check out this neck mass but once that gets underway we are going to have to adjust his diabetic medicines. Past Medical History:  
Diagnosis Date  Diabetes mellitus [address] [address] 12/28/2020  Essential hypertension 12/28/2020  Osteoarthritis 12/28/2020 Current Outpatient Medications on File Prior to Visit Medication Sig Dispense Refill  pioglitazone (ACTOS) 15 mg tablet TAKE 1 TABLET DAILY 90 Tablet 3  
 glipiZIDE (GLUCOTROL) 10 mg tablet TAKE 1 TABLET DAILY 90 Tab 3  
 metFORMIN (GLUCOPHAGE) 500 mg tablet TAKE 2 TABLETS TWICE A  Tab 3  
 fexofenadine (ALLEGRA) 180 mg tablet TAKE 1 TABLET DAILY ( FEXOFENADINE IS GENERIC FOR ALLEGRA ) 90 Tab 3  
 fluticasone propionate (FLONASE) 50 mcg/actuation nasal spray 2 Sprays by Both Nostrils route daily.     
 insulin glargine (LANTUS,BASAGLAR) 100 unit/mL (3 mL) inpn 80 Units by SubCUTAneous route daily.  losartan (COZAAR) 100 mg tablet Take 1 Tab by mouth daily. 90 Tab 3  
 insulin lispro (HUMALOG) 100 unit/mL injection INJECT 10 TO 20 UNITS UNDER THE SKIN WITH MEALS USING A SLIDING SCALE 60 mL 3  
 atorvastatin (LIPITOR) 40 mg tablet TAKE 1 TABLET DAILY 90 Tab 3  [DISCONTINUED] losartan (COZAAR) 50 mg tablet Take 1 Tab by mouth daily. 90 Tab 3 No current facility-administered medications on file prior to visit. ROS as noted in the history of present illness Visit Vitals Resp 20 Ht 6' (1.829 m) Wt 278 lb 6.4 oz (126.3 kg) BMI 37.76 kg/m² Physical Exam well-developed  man normocephalic with right neck mass that is fairly soft and nontender. I do not feel supraclavicular nodes. His lungs are clear his cardiac exam is benign. Assessment & Plan emergency room document is reviewed as are imaging studies and the [employer] note. I think the first order of business would be to get a skinny needle biopsy of that right neck mass and then go from there. As noted he is ultimately going to require better control of his blood sugars Winsome Workman MD

## 2021-06-02 NOTE — TELEPHONE ENCOUNTER
Shirin from Dr. Kareen Mcelroy office called and she needed a  Referral for Brent Guerra. She said something about Mayo Sagastume seeing Dr. Aamir Sandoval instead of Dr. Renea Alatorre.     Her phone number is [phone]    Fax number is [phone]

## 2021-06-14 NOTE — TELEPHONE ENCOUNTER
Shital with [employer] calls to say they received a referral for this patient with a diagnosis of a lump in his neck. I am going to call Dr Iqra Llanos office and see if they sent that (to the wrong place). I told Mary Be I will let her know once I talk to them. Called and spoke with Lee Ann who says she came over and spoke with [facility] who was going to initiate a referral to Dr Linsey Lopez for this patient. [employer] says Dr Kd Paulino office is waiting on the referral to make this patient an appoint. Thank you.       referral

## 2021-06-15 NOTE — TELEPHONE ENCOUNTER
Entered request into [employer] system for Dr. Linsey Lopez. This provider is not an in- network provider so it was pended for review and  assigned it to Dr. Enid Griffiths DPM.  I have requested the change in the  system and it is assigned to review. They have up to 3 days to process this request.  Will update when  updates.

## 2021-09-01 NOTE — PROGRESS NOTES
Bar Arboleda is a 61 y.o. male presenting for hospital follow-up          Chief Complaint   Patient presents with   [facility] Follow Up        HPI Jessica Miller is seen today for the first time since the end of May when he presented to our office with a neck mass. By reviewing care everywhere and his chart I find that he has been diagnosed with mediastinal large B cell lymphoma. He underwent chemotherapy over the weekend and was discharged from [facility] on Sunday. On Monday he just stayed at home and felt terrible although he does say that he got Neulasta. He also says he got blood work yesterday. Sometime yesterday he began having increasing shortness of breath. He has not coughed or had fever. He is is \"weak as water\" he is dyspneic at rest and he is having palpitations. Although he drove himself to the hospital today for this office visit he does not feel like doing much of anything. He is not coughing he has not checked his blood sugars the last blood work I can review is from the 29th with a reasonable and stable H&H. Past Medical History:   Diagnosis Date    Diabetes mellitus ([facility] [address] 12/28/2020    Essential hypertension 12/28/2020    Osteoarthritis 12/28/2020        Current Outpatient Medications on File Prior to Visit   Medication Sig Dispense Refill    Lantus Solostar U-100 Insulin 100 unit/mL (3 mL) inpn INJECT 80 UNITS UNDER THE SKIN DAILY 225 mL 3    FreeStyle Lite Strips strip USE TO TEST THREE TIMES A  Strip 3    pioglitazone (ACTOS) 15 mg tablet TAKE 1 TABLET DAILY 90 Tablet 3    glipiZIDE (GLUCOTROL) 10 mg tablet TAKE 1 TABLET DAILY 90 Tab 3    metFORMIN (GLUCOPHAGE) 500 mg tablet TAKE 2 TABLETS TWICE A  Tab 3    fexofenadine (ALLEGRA) 180 mg tablet TAKE 1 TABLET DAILY ( FEXOFENADINE IS GENERIC FOR ALLEGRA ) 90 Tab 3    fluticasone propionate (FLONASE) 50 mcg/actuation nasal spray 2 Sprays by Both Nostrils route daily.       losartan (COZAAR) 100 mg tablet Take 1 Tab by mouth daily. 90 Tab 3    insulin lispro (HUMALOG) 100 unit/mL injection INJECT 10 TO 20 UNITS UNDER THE SKIN WITH MEALS USING A SLIDING SCALE 60 mL 3    atorvastatin (LIPITOR) 40 mg tablet TAKE 1 TABLET DAILY 90 Tab 3     No current facility-administered medications on file prior to visit. ROS as noted in the history of present illness    Visit Vitals  /70 (BP 1 Location: Left arm, BP Patient Position: Sitting, BP Cuff Size: Large adult)   Pulse (!) 135   Temp 98.7 °F (37.1 °C)   Resp (!) 35   Wt 316 lb (143.3 kg)   SpO2 95%   BMI 42.86 kg/m²        Physical Exam overweight  man who is dyspneic at rest using accessory muscles of respiration to breathe. Vitals are noted and remarkable for tachypnea and tachycardia with an oxygen saturation of 95% on room air. Neck is supple lungs reveal distant breath sounds but no wheezes or consolidation heart rhythm is rapid and sounds and feels irregularly irregular although it is so rapid it is difficult for me to tell. Extremities revealed trace edema    Assessment & Plan I am concerned that Yesenia Sewell may have developed atrial fibrillation or some other tacky arrhythmia but unfortunately I am unable to do an electrocardiogram in my office. He is unwilling to go to the emergency room at [facility] and I do not believe he is stable enough to drive to [facility] on his own. His daughter is in route from [place] to pick him up and take him to the [facility] emergency room. I have attempted to contact Dr. Jud Whitaker to speak with her directly but thus far all we have been been able to accomplish is to leave a message for a nurse by voicemail. Apparently she is not available at this point. I am going to call the emergency department [facility]an [facility] to alert him about Mr. Negro Mendez situation.         Caio Foster MD